# Patient Record
Sex: MALE | Race: WHITE | NOT HISPANIC OR LATINO | URBAN - METROPOLITAN AREA
[De-identification: names, ages, dates, MRNs, and addresses within clinical notes are randomized per-mention and may not be internally consistent; named-entity substitution may affect disease eponyms.]

---

## 2020-07-25 ENCOUNTER — TELEPHONE ENCOUNTER (OUTPATIENT)
Dept: URBAN - METROPOLITAN AREA CLINIC 13 | Facility: CLINIC | Age: 30
End: 2020-07-25

## 2020-07-25 RX ORDER — ADALIMUMAB 40MG/0.8ML
INJECT 40MG SUBCUTANEOUSLY EVERY OTHER WEEK AS DIRECTED KIT SUBCUTANEOUS
Qty: 3 | Refills: 0 | OUTPATIENT
Start: 2019-07-18

## 2020-07-26 ENCOUNTER — TELEPHONE ENCOUNTER (OUTPATIENT)
Dept: URBAN - METROPOLITAN AREA CLINIC 13 | Facility: CLINIC | Age: 30
End: 2020-07-26

## 2020-07-26 RX ORDER — ADALIMUMAB 40MG/0.8ML
INJECT 40MG SUBCUTANEOUSLY EVERY OTHER WEEK AS DIRECTED KIT SUBCUTANEOUS
Qty: 1 | Refills: 3 | Status: ACTIVE | COMMUNITY
Start: 2019-07-18

## 2020-07-26 RX ORDER — ADALIMUMAB 40MG/0.8ML
INJECT 40 MG SUBCUTANEOUSLY EVERY OTHER WEEK AS DIRECTED KIT SUBCUTANEOUS
Refills: 0 | Status: ACTIVE | COMMUNITY

## 2020-07-26 RX ORDER — ADALIMUMAB 40MG/0.4ML
INJECT 40MG SUBCUTANEOUSLY  EVERY OTHER WEEK KIT SUBCUTANEOUS
Qty: 2 | Refills: 6 | Status: ACTIVE | COMMUNITY
Start: 2020-03-17

## 2020-09-08 ENCOUNTER — OFFICE VISIT (OUTPATIENT)
Dept: URBAN - METROPOLITAN AREA CLINIC 113 | Facility: CLINIC | Age: 30
End: 2020-09-08
Payer: COMMERCIAL

## 2020-09-08 VITALS
DIASTOLIC BLOOD PRESSURE: 65 MMHG | HEIGHT: 69 IN | BODY MASS INDEX: 31.1 KG/M2 | HEART RATE: 55 BPM | SYSTOLIC BLOOD PRESSURE: 108 MMHG | TEMPERATURE: 97.3 F | WEIGHT: 210 LBS

## 2020-09-08 DIAGNOSIS — K50.10 CROHN'S DISEASE OF COLON WITHOUT COMPLICATION: ICD-10-CM

## 2020-09-08 PROCEDURE — G8427 DOCREV CUR MEDS BY ELIG CLIN: HCPCS | Performed by: INTERNAL MEDICINE

## 2020-09-08 PROCEDURE — G9903 PT SCRN TBCO ID AS NON USER: HCPCS | Performed by: INTERNAL MEDICINE

## 2020-09-08 PROCEDURE — 99214 OFFICE O/P EST MOD 30 MIN: CPT | Performed by: INTERNAL MEDICINE

## 2020-09-08 PROCEDURE — 82306 VITAMIN D 25 HYDROXY: CPT | Performed by: INTERNAL MEDICINE

## 2020-09-08 PROCEDURE — G8420 CALC BMI NORM PARAMETERS: HCPCS | Performed by: INTERNAL MEDICINE

## 2020-09-08 RX ORDER — ADALIMUMAB 40MG/0.4ML
INJECT 40MG SUBCUTANEOUSLY  EVERY OTHER WEEK KIT SUBCUTANEOUS
Qty: 2 | Refills: 6 | Status: ACTIVE | COMMUNITY
Start: 2020-03-17

## 2020-09-08 RX ORDER — ADALIMUMAB 40MG/0.8ML
INJECT 40MG SUBCUTANEOUSLY EVERY OTHER WEEK AS DIRECTED KIT SUBCUTANEOUS
Qty: 1 | Refills: 3 | Status: ACTIVE | COMMUNITY
Start: 2019-07-18

## 2020-09-08 RX ORDER — METRONIDAZOLE 500 MG/1
TAKE 1 PO TID AS DIRECTED TABLET, FILM COATED ORAL
Qty: 0 | Refills: 0 | Status: ON HOLD | COMMUNITY
Start: 1900-01-01

## 2020-09-08 RX ORDER — SODIUM PICOSULFATE, MAGNESIUM OXIDE, AND ANHYDROUS CITRIC ACID 10; 3.5; 12 MG/160ML; G/160ML; G/160ML
DRINK 160 ML LIQUID ORAL DAILY
Qty: 160 MILLILITER | Refills: 0 | OUTPATIENT
Start: 2020-09-08 | End: 2020-09-09

## 2020-09-08 RX ORDER — IBUPROFEN 100 MG
TAKE 2 TABLETS (200 MG) BY ORAL ROUTE EVERY 6 HOURS AS NEEDED WITH FOOD TABLET ORAL
Qty: 0 | Refills: 0 | Status: ON HOLD | COMMUNITY
Start: 1900-01-01

## 2020-09-08 RX ORDER — SODIUM PICOSULFATE, MAGNESIUM OXIDE, AND ANHYDROUS CITRIC ACID 10; 3.5; 12 MG/16.2G; G/16.2G; G/16.2G
AS DIRECTED POWDER, METERED ORAL
Qty: 1 | Refills: 0 | Status: ON HOLD | COMMUNITY
Start: 2016-04-04

## 2020-09-08 RX ORDER — ADALIMUMAB 40MG/0.8ML
INJECT 40 MG SUBCUTANEOUSLY EVERY OTHER WEEK AS DIRECTED KIT SUBCUTANEOUS
Refills: 0 | Status: ACTIVE | COMMUNITY

## 2020-09-08 RX ORDER — ADALIMUMAB 40MG/0.8ML
INJECT 40 MG SUBCUTANEOUSLY EVERY OTHER WEEK AS DIRECTED KIT SUBCUTANEOUS
OUTPATIENT

## 2020-09-08 NOTE — HPI-OTHER HISTORIES
Mr. Simpson is a 30 year old male with a history of sleep apnea, knee surgery, and Crohn's colitis on Humira 40mg/0.8 ml q 2 weeks, initiially presenting to establish care.  He was last seen by Dr. Martinez July 2019.  He is establishing care with Dr. Prasad next week.      He has Crohn's ileitis diagnosed in 2016 following an illness with symptoms of fatigue, fever, night sweats, and diarrhea with occasional blood per rectum, marginally improved following antibiotic treatment, with relapse of symptoms including weight loss. He had a CT scan in April 2016 which noted segmental colonic wall thickening involving the transverse as well as the left colon. He underwent colonoscopy in May 2016 in [place], [place] which was notable for friability, granularity, ulceration, congestion, erythema in the cecum, transverse colon and sigmoid colon compatible with indeterminate colitis. Biopsies revealed chronic active colitis with mild histologic activity, negative for dysplasia. He was initially treated with Asacol, Flagyl and prednisone, with excellent response. Humira was initiated in July 2016. He did go off of Humira about a year and a half ago secondary to insurance changes and difficulty obtaining the medication. This resulted in recurrence of symptoms, that resolved following re-induction of Humira.  Previous colonoscopy May 2016 by Dr. Brandon Price in [facility] revealed friability, granularity and ulceration in the cecum, transverse colon and sigmoid colon concerning for Crohn's colitis.  Biopsies revealed chronic active colitis, rare granuloma, negative for dysplasia.

## 2020-09-08 NOTE — HPI-TODAY'S VISIT:
Overall he is doing fairly well.  No recent labs.  He had to cancel his colonoscopy due to work.  He is on Humira every other week.  He is no longer on Asacol.  He has occasional blood when he wipes only.  He denies abdominal pain, nausea, vomiting, change in bowel habits, blood in the stool or weight loss.   He quit smoking in 2018.  The patient denies any significant alcohol use, tobacco use or illicit drug use.  He uses Aleve occasionally.   No family history of colon cancer, inflammatory bowel disease GI cancers, peptic ulcer disease or chronic liver disease.

## 2020-09-16 ENCOUNTER — TELEPHONE ENCOUNTER (OUTPATIENT)
Dept: URBAN - METROPOLITAN AREA CLINIC 113 | Facility: CLINIC | Age: 30
End: 2020-09-16

## 2020-09-16 RX ORDER — POLYETHYLENE GLYCOL 3350, SODIUM CHLORIDE, SODIUM BICARBONATE, POTASSIUM CHLORIDE 420; 11.2; 5.72; 1.48 G/4L; G/4L; G/4L; G/4L
TAKE 1ST HALF OF BOWEL PREP 5PM THE NIGHT BEFORE. TAKE 2ND HALF 6 HOURS PRIOR TO PROCEDURE TIME POWDER, FOR SOLUTION ORAL 1
Qty: 4000 MILLILITER | Refills: 0 | OUTPATIENT
Start: 2020-09-16 | End: 2020-09-17

## 2020-09-23 ENCOUNTER — OFFICE VISIT (OUTPATIENT)
Dept: URBAN - METROPOLITAN AREA SURGERY CENTER 25 | Facility: SURGERY CENTER | Age: 30
End: 2020-09-23

## 2020-09-23 ENCOUNTER — TELEPHONE ENCOUNTER (OUTPATIENT)
Dept: URBAN - METROPOLITAN AREA CLINIC 113 | Facility: CLINIC | Age: 30
End: 2020-09-23

## 2020-09-28 ENCOUNTER — OFFICE VISIT (OUTPATIENT)
Dept: URBAN - METROPOLITAN AREA SURGERY CENTER 25 | Facility: SURGERY CENTER | Age: 30
End: 2020-09-28

## 2020-10-06 ENCOUNTER — CLAIMS CREATED FROM THE CLAIM WINDOW (OUTPATIENT)
Dept: URBAN - METROPOLITAN AREA CLINIC 4 | Facility: CLINIC | Age: 30
End: 2020-10-06
Payer: COMMERCIAL

## 2020-10-06 ENCOUNTER — OFFICE VISIT (OUTPATIENT)
Dept: URBAN - METROPOLITAN AREA SURGERY CENTER 25 | Facility: SURGERY CENTER | Age: 30
End: 2020-10-06
Payer: COMMERCIAL

## 2020-10-06 DIAGNOSIS — K50.10 CC (CROHN'S COLITIS): ICD-10-CM

## 2020-10-06 DIAGNOSIS — K63.89 OTHER SPECIFIED DISEASES OF INTESTINE: ICD-10-CM

## 2020-10-06 PROCEDURE — 88305 TISSUE EXAM BY PATHOLOGIST: CPT | Performed by: PATHOLOGY

## 2020-10-06 PROCEDURE — 45380 COLONOSCOPY AND BIOPSY: CPT | Performed by: INTERNAL MEDICINE

## 2020-12-03 ENCOUNTER — TELEPHONE ENCOUNTER (OUTPATIENT)
Dept: URBAN - METROPOLITAN AREA CLINIC 113 | Facility: CLINIC | Age: 30
End: 2020-12-03

## 2020-12-03 RX ORDER — ADALIMUMAB 40MG/0.8ML
INJECT 40 MG SUBCUTANEOUSLY EVERY OTHER WEEK AS DIRECTED KIT SUBCUTANEOUS EVERY OTHER WEEK
Qty: 2 | Refills: 12

## 2020-12-08 ENCOUNTER — OFFICE VISIT (OUTPATIENT)
Dept: URBAN - METROPOLITAN AREA CLINIC 113 | Facility: CLINIC | Age: 30
End: 2020-12-08
Payer: COMMERCIAL

## 2020-12-08 ENCOUNTER — WEB ENCOUNTER (OUTPATIENT)
Dept: URBAN - METROPOLITAN AREA CLINIC 113 | Facility: CLINIC | Age: 30
End: 2020-12-08

## 2020-12-08 VITALS
HEART RATE: 61 BPM | WEIGHT: 211 LBS | DIASTOLIC BLOOD PRESSURE: 61 MMHG | HEIGHT: 69 IN | BODY MASS INDEX: 31.25 KG/M2 | TEMPERATURE: 97.5 F | SYSTOLIC BLOOD PRESSURE: 98 MMHG

## 2020-12-08 DIAGNOSIS — K50.10 CROHN'S DISEASE OF COLON WITHOUT COMPLICATION: ICD-10-CM

## 2020-12-08 PROCEDURE — G8420 CALC BMI NORM PARAMETERS: HCPCS | Performed by: INTERNAL MEDICINE

## 2020-12-08 PROCEDURE — G8427 DOCREV CUR MEDS BY ELIG CLIN: HCPCS | Performed by: INTERNAL MEDICINE

## 2020-12-08 PROCEDURE — 99214 OFFICE O/P EST MOD 30 MIN: CPT | Performed by: INTERNAL MEDICINE

## 2020-12-08 PROCEDURE — G9903 PT SCRN TBCO ID AS NON USER: HCPCS | Performed by: INTERNAL MEDICINE

## 2020-12-08 RX ORDER — SODIUM PICOSULFATE, MAGNESIUM OXIDE, AND ANHYDROUS CITRIC ACID 10; 3.5; 12 MG/16.2G; G/16.2G; G/16.2G
AS DIRECTED POWDER, METERED ORAL
Qty: 1 | Refills: 0 | Status: ON HOLD | COMMUNITY
Start: 2016-04-04

## 2020-12-08 RX ORDER — ADALIMUMAB 40MG/0.4ML
INJECT 40MG SUBCUTANEOUSLY  EVERY OTHER WEEK KIT SUBCUTANEOUS
Qty: 2 | Refills: 6 | Status: ACTIVE | COMMUNITY
Start: 2020-03-17

## 2020-12-08 RX ORDER — ADALIMUMAB 40MG/0.8ML
INJECT 40MG SUBCUTANEOUSLY EVERY OTHER WEEK AS DIRECTED KIT SUBCUTANEOUS
Qty: 1 | Refills: 3 | Status: ACTIVE | COMMUNITY
Start: 2019-07-18

## 2020-12-08 RX ORDER — IBUPROFEN 100 MG
TAKE 2 TABLETS (200 MG) BY ORAL ROUTE EVERY 6 HOURS AS NEEDED WITH FOOD TABLET ORAL
Qty: 0 | Refills: 0 | Status: ON HOLD | COMMUNITY
Start: 1900-01-01

## 2020-12-08 RX ORDER — ADALIMUMAB 40MG/0.8ML
INJECT 40 MG SUBCUTANEOUSLY EVERY OTHER WEEK AS DIRECTED KIT SUBCUTANEOUS EVERY OTHER WEEK
Qty: 2 | Refills: 12

## 2020-12-08 RX ORDER — ADALIMUMAB 40MG/0.8ML
INJECT 40 MG SUBCUTANEOUSLY EVERY OTHER WEEK AS DIRECTED KIT SUBCUTANEOUS EVERY OTHER WEEK
Qty: 2 | Refills: 12 | Status: ACTIVE | COMMUNITY

## 2020-12-08 RX ORDER — METRONIDAZOLE 500 MG/1
TAKE 1 PO TID AS DIRECTED TABLET, FILM COATED ORAL
Qty: 0 | Refills: 0 | Status: ON HOLD | COMMUNITY
Start: 1900-01-01

## 2022-08-16 ENCOUNTER — WEB ENCOUNTER (OUTPATIENT)
Dept: URBAN - METROPOLITAN AREA CLINIC 113 | Facility: CLINIC | Age: 32
End: 2022-08-16

## 2022-08-22 ENCOUNTER — OFFICE VISIT (OUTPATIENT)
Dept: URBAN - METROPOLITAN AREA CLINIC 113 | Facility: CLINIC | Age: 32
End: 2022-08-22
Payer: COMMERCIAL

## 2022-08-22 ENCOUNTER — WEB ENCOUNTER (OUTPATIENT)
Dept: URBAN - METROPOLITAN AREA CLINIC 113 | Facility: CLINIC | Age: 32
End: 2022-08-22

## 2022-08-22 VITALS
WEIGHT: 200 LBS | HEIGHT: 69 IN | BODY MASS INDEX: 29.62 KG/M2 | HEART RATE: 62 BPM | DIASTOLIC BLOOD PRESSURE: 74 MMHG | SYSTOLIC BLOOD PRESSURE: 126 MMHG | TEMPERATURE: 98.2 F

## 2022-08-22 DIAGNOSIS — K50.10 CROHN'S DISEASE OF COLON WITHOUT COMPLICATION: ICD-10-CM

## 2022-08-22 PROCEDURE — 99214 OFFICE O/P EST MOD 30 MIN: CPT | Performed by: INTERNAL MEDICINE

## 2022-08-22 RX ORDER — ADALIMUMAB 40MG/0.8ML
INJECT 40MG SUBCUTANEOUSLY EVERY OTHER WEEK AS DIRECTED KIT SUBCUTANEOUS
Qty: 1 | Refills: 3 | Status: ACTIVE | COMMUNITY
Start: 2019-07-18

## 2022-08-22 RX ORDER — SODIUM PICOSULFATE, MAGNESIUM OXIDE, AND ANHYDROUS CITRIC ACID 10; 3.5; 12 MG/16.2G; G/16.2G; G/16.2G
AS DIRECTED POWDER, METERED ORAL
Qty: 1 | Refills: 0 | Status: ON HOLD | COMMUNITY
Start: 2016-04-04

## 2022-08-22 RX ORDER — ADALIMUMAB 40MG/0.8ML
INJECT 40 MG SUBCUTANEOUSLY EVERY OTHER WEEK AS DIRECTED KIT SUBCUTANEOUS EVERY OTHER WEEK
Qty: 2 | Refills: 12 | Status: ACTIVE | COMMUNITY

## 2022-08-22 RX ORDER — ADALIMUMAB 40MG/0.4ML
INJECT 40MG SUBCUTANEOUSLY  EVERY OTHER WEEK KIT SUBCUTANEOUS
Qty: 2 | Refills: 6 | Status: ACTIVE | COMMUNITY
Start: 2020-03-17

## 2022-08-22 RX ORDER — IBUPROFEN 100 MG
TAKE 2 TABLETS (200 MG) BY ORAL ROUTE EVERY 6 HOURS AS NEEDED WITH FOOD TABLET ORAL
Qty: 0 | Refills: 0 | Status: ON HOLD | COMMUNITY
Start: 1900-01-01

## 2022-08-22 RX ORDER — METRONIDAZOLE 500 MG/1
TAKE 1 PO TID AS DIRECTED TABLET, FILM COATED ORAL
Qty: 0 | Refills: 0 | Status: ON HOLD | COMMUNITY
Start: 1900-01-01

## 2022-08-22 RX ORDER — ADALIMUMAB 40MG/0.8ML
INJECT 40 MG SUBCUTANEOUSLY EVERY OTHER WEEK AS DIRECTED KIT SUBCUTANEOUS EVERY OTHER WEEK
Qty: 2 | Refills: 12

## 2022-08-22 NOTE — HPI-TODAY'S VISIT:
Mr. Simpson is a 32 year old male with a history of sleep apnea and Crohn's colitis previously on Humira 40mg/0.8 ml q 2 weeks here for long interval follow up.   He was last seen in the office December 2020 for colonoscopy follow-up.  He self discontinued Humira around April mainly due to frequent traveling.  He has been on no medication since that time.  Overall he is doing fairly well and having semiformed bowel movements daily without blood.  He denies abdominal pain, nausea, vomiting, weight loss, fevers or chills.  He has not had labs for over 1 year, but is due for routine labs with his PCP next month.  He was tolerating Humira fairly well, but did not like injections every 2 weeks as this was too frequent.  Prior colonoscopy 10/6/2020 for assessment of Crohn's disease revealed mild inflammation characterized by erythema, friability and pseudopolyps mainly in the rectum and sigmoid colon, the remainder the colon was otherwise normal, the distal ileum was also normal.  Multiple random colon biopsies revealed no significant abnormalities, no evidence of dysplasia or inflammation.  He has Crohn's ileitis diagnosed in 2016.   He had a CT scan in April 2016 which noted segmental colonic wall thickening involving the transverse as well as the left colon. He underwent colonoscopy in May 2016 in [place], [place] which was notable for friability, granularity, ulceration, congestion, erythema in the cecum, transverse colon and sigmoid colon compatible with indeterminate colitis. Biopsies revealed chronic active colitis with mild histologic activity, negative for dysplasia. He was initially treated with Asacol, Flagyl and prednisone, with excellent response. Humira was initiated in July 2016. Previous colonoscopy May 2016 by Dr. Brandon Price in [facility] revealed friability, granularity and ulceration in the cecum, transverse colon and sigmoid colon concerning for Crohn's.  Biopsies revealed chronic active colitis, rare granuloma, negative for dysplasia.

## 2022-11-22 ENCOUNTER — OFFICE VISIT (OUTPATIENT)
Dept: URBAN - METROPOLITAN AREA CLINIC 113 | Facility: CLINIC | Age: 32
End: 2022-11-22
Payer: COMMERCIAL

## 2022-11-22 ENCOUNTER — WEB ENCOUNTER (OUTPATIENT)
Dept: URBAN - METROPOLITAN AREA CLINIC 113 | Facility: CLINIC | Age: 32
End: 2022-11-22

## 2022-11-22 VITALS
DIASTOLIC BLOOD PRESSURE: 63 MMHG | BODY MASS INDEX: 28.73 KG/M2 | WEIGHT: 194 LBS | TEMPERATURE: 97.5 F | HEART RATE: 74 BPM | HEIGHT: 69 IN | SYSTOLIC BLOOD PRESSURE: 108 MMHG | RESPIRATION RATE: 20 BRPM

## 2022-11-22 DIAGNOSIS — K50.10 CROHN'S DISEASE OF COLON WITHOUT COMPLICATION: ICD-10-CM

## 2022-11-22 PROCEDURE — 99213 OFFICE O/P EST LOW 20 MIN: CPT | Performed by: NURSE PRACTITIONER

## 2022-11-22 RX ORDER — ADALIMUMAB 40MG/0.8ML
INJECT 40 MG SUBCUTANEOUSLY EVERY OTHER WEEK AS DIRECTED KIT SUBCUTANEOUS EVERY OTHER WEEK
Qty: 2 | Refills: 12 | Status: ON HOLD | COMMUNITY

## 2022-11-22 RX ORDER — IBUPROFEN 100 MG
TAKE 2 TABLETS (200 MG) BY ORAL ROUTE EVERY 6 HOURS AS NEEDED WITH FOOD TABLET ORAL
Qty: 0 | Refills: 0 | Status: ON HOLD | COMMUNITY
Start: 1900-01-01

## 2022-11-22 RX ORDER — ADALIMUMAB 40MG/0.4ML
INJECT 40MG SUBCUTANEOUSLY  EVERY OTHER WEEK KIT SUBCUTANEOUS
Qty: 2 | Refills: 6 | Status: ON HOLD | COMMUNITY
Start: 2020-03-17

## 2022-11-22 RX ORDER — METRONIDAZOLE 500 MG/1
TAKE 1 PO TID AS DIRECTED TABLET, FILM COATED ORAL
Qty: 0 | Refills: 0 | Status: ON HOLD | COMMUNITY
Start: 1900-01-01

## 2022-11-22 RX ORDER — SODIUM PICOSULFATE, MAGNESIUM OXIDE, AND ANHYDROUS CITRIC ACID 10; 3.5; 12 MG/16.2G; G/16.2G; G/16.2G
AS DIRECTED POWDER, METERED ORAL
Qty: 1 | Refills: 0 | Status: ON HOLD | COMMUNITY
Start: 2016-04-04

## 2022-11-22 RX ORDER — ADALIMUMAB 40MG/0.8ML
INJECT 40MG SUBCUTANEOUSLY EVERY OTHER WEEK AS DIRECTED KIT SUBCUTANEOUS
Qty: 1 | Refills: 3 | Status: ON HOLD | COMMUNITY
Start: 2019-07-18

## 2022-11-22 NOTE — HPI-TODAY'S VISIT:
Mr. Simpson is a 32 year old male with a history of sleep apnea and Crohn's colitis previously on Humira 40mg/0.8 ml q 2 weeks presenting for follow up. He self discontinued Humira around April mainly due to frequent traveling.  He has been on no medication since that time. He was seen in the office in August for follow-up of Crohn's colitis, clinically doing well following self-discontinuation of Humira. Prior colonoscopy revealed mild inflammation in the rectosigmoid mainly with scarring and pseudopolyps; random colon biopsies were unremarkable. Routine labs were planned, with consideration for reinduction of Humira versus initiation of Stelara or Entyvio SQ pending clinical course. He continues to do fairly well from a GI standpoint. He has occasional postprandial flares of diffuse abdominal discomfort and diarrhea, which he attributes to diet. His bowel habits are otherwise regular. He does see a scant amount of red blood on the toilet paper with wiping on occasion, which he thinks may be related to hemorrhoids. He is interested in resuming biologic therapy. He did not have labs performed last visit.

## 2022-11-22 NOTE — HPI-OTHER HISTORIES
Prior colonoscopy 10/6/2020 for assessment of Crohn's disease revealed mild inflammation characterized by erythema, friability and pseudopolyps mainly in the rectum and sigmoid colon, the remainder the colon was otherwise normal, the distal ileum was also normal.  Multiple random colon biopsies revealed no significant abnormalities, no evidence of dysplasia or inflammation.  He has Crohn's ileitis diagnosed in 2016.   He had a CT scan in April 2016 which noted segmental colonic wall thickening involving the transverse as well as the left colon. He underwent colonoscopy in May 2016 in [place], [place] which was notable for friability, granularity, ulceration, congestion, erythema in the cecum, transverse colon and sigmoid colon compatible with indeterminate colitis. Biopsies revealed chronic active colitis with mild histologic activity, negative for dysplasia. He was initially treated with Asacol, Flagyl and prednisone, with excellent response. Humira was initiated in July 2016. Previous colonoscopy May 2016 by Dr. Brandon Price in [facility] revealed friability, granularity and ulceration in the cecum, transverse colon and sigmoid colon concerning for Crohn's.  Biopsies revealed chronic active colitis, rare granuloma, negative for dysplasia.

## 2022-12-08 ENCOUNTER — TELEPHONE ENCOUNTER (OUTPATIENT)
Dept: URBAN - METROPOLITAN AREA CLINIC 113 | Facility: CLINIC | Age: 32
End: 2022-12-08

## 2022-12-08 RX ORDER — USTEKINUMAB 90 MG/ML
AS DIRECTED INJECTION, SOLUTION SUBCUTANEOUS
Qty: 3 SYRINGES | Refills: 2 | OUTPATIENT
Start: 2022-12-08 | End: 2023-09-04

## 2022-12-08 RX ORDER — USTEKINUMAB 130 MG/26ML
AS DIRECTED SOLUTION INTRAVENOUS ONCE
Qty: 390 MILLIGRAMS | Refills: 0 | OUTPATIENT
Start: 2022-12-08 | End: 2022-12-09

## 2022-12-20 ENCOUNTER — LAB OUTSIDE AN ENCOUNTER (OUTPATIENT)
Dept: URBAN - METROPOLITAN AREA CLINIC 113 | Facility: CLINIC | Age: 32
End: 2022-12-20

## 2022-12-24 LAB
A/G RATIO: 2.3
ABSOLUTE BASOPHILS: 39
ABSOLUTE EOSINOPHILS: 209
ABSOLUTE LYMPHOCYTES: 1491
ABSOLUTE MONOCYTES: 308
ABSOLUTE NEUTROPHILS: 3454
ALBUMIN: 4.8
ALKALINE PHOSPHATASE: 46
ALT (SGPT): 42
AST (SGOT): 52
BASOPHILS: 0.7
BILIRUBIN, TOTAL: 0.7
BUN/CREATININE RATIO: (no result)
BUN: 14
C-REACTIVE PROTEIN, QUANT: 2.1
CALCIUM: 9.7
CARBON DIOXIDE, TOTAL: 27
CHLORIDE: 103
CREATININE: 1.07
EGFR: 96
EOSINOPHILS: 3.8
GLOBULIN, TOTAL: 2.1
GLUCOSE: 93
HBSAG SCREEN: (no result)
HEMATOCRIT: 42.4
HEMOGLOBIN: 14.9
HEP A AB, IGM: (no result)
HEP B CORE AB, IGM: (no result)
HEP C VIRUS AB: <0.02
HEPATITIS A AB, TOTAL: REACTIVE
HEPATITIS B CORE AB TOTAL: (no result)
HEPATITIS B SURFACE AB, QN: 18
HEPATITIS B SURFACE ANTIGEN: (no result)
HEPATITIS C ANTIBODY: (no result)
INTERPRETATION: (no result)
LYMPHOCYTES: 27.1
MCH: 29.5
MCHC: 35.1
MCV: 84
MITOGEN-NIL: >10
MONOCYTES: 5.6
MPV: 9.6
NEUTROPHILS: 62.8
NIL: 0.04
PLATELET COUNT: 204
POTASSIUM: 3.9
PROTEIN, TOTAL: 6.9
QUANTIFERON(R)-TB GOLD: NEGATIVE
RDW: 12.4
RED BLOOD CELL COUNT: 5.05
SED RATE BY MODIFIED: 2
SODIUM: 139
TB1-NIL: 0
TB2-NIL: <0
VITAMIN D,25-OH,TOTAL,IA: 34
WHITE BLOOD CELL COUNT: 5.5

## 2022-12-29 ENCOUNTER — TELEPHONE ENCOUNTER (OUTPATIENT)
Dept: URBAN - METROPOLITAN AREA CLINIC 112 | Facility: CLINIC | Age: 32
End: 2022-12-29

## 2023-02-23 ENCOUNTER — OFFICE VISIT (OUTPATIENT)
Dept: URBAN - METROPOLITAN AREA CLINIC 113 | Facility: CLINIC | Age: 33
End: 2023-02-23
Payer: COMMERCIAL

## 2023-02-23 VITALS
TEMPERATURE: 97.5 F | DIASTOLIC BLOOD PRESSURE: 75 MMHG | WEIGHT: 196 LBS | SYSTOLIC BLOOD PRESSURE: 128 MMHG | HEART RATE: 69 BPM | BODY MASS INDEX: 29.03 KG/M2 | RESPIRATION RATE: 18 BRPM | HEIGHT: 69 IN

## 2023-02-23 DIAGNOSIS — K50.10 CROHN'S DISEASE OF COLON WITHOUT COMPLICATION: ICD-10-CM

## 2023-02-23 DIAGNOSIS — R74.8 ELEVATED LIVER ENZYMES: ICD-10-CM

## 2023-02-23 PROBLEM — 7620006 CROHN'S DISEASE OF LARGE BOWEL: Status: ACTIVE | Noted: 2022-08-22

## 2023-02-23 PROCEDURE — 99214 OFFICE O/P EST MOD 30 MIN: CPT | Performed by: NURSE PRACTITIONER

## 2023-02-23 RX ORDER — IBUPROFEN 100 MG
TAKE 2 TABLETS (200 MG) BY ORAL ROUTE EVERY 6 HOURS AS NEEDED WITH FOOD TABLET ORAL
Qty: 0 | Refills: 0 | Status: ON HOLD | COMMUNITY
Start: 1900-01-01

## 2023-02-23 RX ORDER — SODIUM PICOSULFATE, MAGNESIUM OXIDE, AND ANHYDROUS CITRIC ACID 10; 3.5; 12 MG/16.2G; G/16.2G; G/16.2G
AS DIRECTED POWDER, METERED ORAL
Qty: 1 | Refills: 0 | Status: ON HOLD | COMMUNITY
Start: 2016-04-04

## 2023-02-23 RX ORDER — ADALIMUMAB 40MG/0.4ML
INJECT 40MG SUBCUTANEOUSLY  EVERY OTHER WEEK KIT SUBCUTANEOUS
Qty: 2 | Refills: 6 | Status: ON HOLD | COMMUNITY
Start: 2020-03-17

## 2023-02-23 RX ORDER — METRONIDAZOLE 500 MG/1
TAKE 1 PO TID AS DIRECTED TABLET, FILM COATED ORAL
Qty: 0 | Refills: 0 | Status: ON HOLD | COMMUNITY
Start: 1900-01-01

## 2023-02-23 RX ORDER — USTEKINUMAB 90 MG/ML
AS DIRECTED INJECTION, SOLUTION SUBCUTANEOUS
Qty: 3 SYRINGES | Refills: 2 | OUTPATIENT
Start: 2023-02-23 | End: 2023-11-20

## 2023-02-23 RX ORDER — USTEKINUMAB 90 MG/ML
AS DIRECTED INJECTION, SOLUTION SUBCUTANEOUS
Qty: 3 SYRINGES | Refills: 2 | Status: ACTIVE | COMMUNITY
Start: 2022-12-08 | End: 2023-09-04

## 2023-02-23 RX ORDER — ADALIMUMAB 40MG/0.8ML
INJECT 40 MG SUBCUTANEOUSLY EVERY OTHER WEEK AS DIRECTED KIT SUBCUTANEOUS EVERY OTHER WEEK
Qty: 2 | Refills: 12 | Status: ON HOLD | COMMUNITY

## 2023-02-23 RX ORDER — ADALIMUMAB 40MG/0.8ML
INJECT 40MG SUBCUTANEOUSLY EVERY OTHER WEEK AS DIRECTED KIT SUBCUTANEOUS
Qty: 1 | Refills: 3 | Status: ON HOLD | COMMUNITY
Start: 2019-07-18

## 2023-02-23 NOTE — HPI-OTHER HISTORIES
Prior colonoscopy 10/6/2020 for assessment of Crohn's disease revealed mild inflammation characterized by erythema, friability and pseudopolyps mainly in the rectum and sigmoid colon, the remainder the colon was otherwise normal, the distal ileum was also normal.  Multiple random colon biopsies revealed no significant abnormalities, no evidence of dysplasia or inflammation.  He has Crohn's ileitis diagnosed in 2016.   He had a CT scan in April 2016 which noted segmental colonic wall thickening involving the transverse as well as the left colon. He underwent colonoscopy in May 2016 in [place], [place] which was notable for friability, granularity, ulceration, congestion, erythema in the cecum, transverse colon and sigmoid colon compatible with indeterminate colitis. Biopsies revealed chronic active colitis with mild histologic activity, negative for dysplasia. He was initially treated with Asacol, Flagyl and prednisone, with excellent response. Humira was initiated in July 2016. Previous colonoscopy May 2016 by Dr. Brandon Price in [facility] revealed friability, granularity and ulceration in the cecum, transverse colon and sigmoid colon concerning for Crohn's.  Biopsies revealed chronic active colitis, rare granuloma, negative for dysplasia.

## 2023-02-24 LAB
ALBUMIN/GLOBULIN RATIO: 2.3
ALBUMIN: 4.8
ALKALINE PHOSPHATASE: 44
ALT (SGPT): 23
AST (SGOT): 17
BILIRUBIN, DIRECT: 0.1
BILIRUBIN, INDIRECT: 0.6
BILIRUBIN, TOTAL: 0.7
GLOBULIN: 2.1
PROTEIN, TOTAL: 6.9

## 2023-03-02 ENCOUNTER — TELEPHONE ENCOUNTER (OUTPATIENT)
Dept: URBAN - METROPOLITAN AREA CLINIC 113 | Facility: CLINIC | Age: 33
End: 2023-03-02

## 2023-03-10 ENCOUNTER — WEB ENCOUNTER (OUTPATIENT)
Dept: URBAN - METROPOLITAN AREA CLINIC 113 | Facility: CLINIC | Age: 33
End: 2023-03-10

## 2023-03-22 ENCOUNTER — WEB ENCOUNTER (OUTPATIENT)
Dept: URBAN - METROPOLITAN AREA CLINIC 113 | Facility: CLINIC | Age: 33
End: 2023-03-22

## 2023-03-27 ENCOUNTER — OFFICE VISIT (OUTPATIENT)
Dept: URBAN - METROPOLITAN AREA CLINIC 112 | Facility: CLINIC | Age: 33
End: 2023-03-27

## 2023-04-13 ENCOUNTER — TELEPHONE ENCOUNTER (OUTPATIENT)
Dept: URBAN - METROPOLITAN AREA CLINIC 23 | Facility: CLINIC | Age: 33
End: 2023-04-13

## 2023-04-20 ENCOUNTER — TELEPHONE ENCOUNTER (OUTPATIENT)
Dept: URBAN - METROPOLITAN AREA CLINIC 23 | Facility: CLINIC | Age: 33
End: 2023-04-20

## 2023-04-25 ENCOUNTER — OFFICE VISIT (OUTPATIENT)
Dept: URBAN - METROPOLITAN AREA CLINIC 112 | Facility: CLINIC | Age: 33
End: 2023-04-25
Payer: COMMERCIAL

## 2023-04-25 ENCOUNTER — TELEPHONE ENCOUNTER (OUTPATIENT)
Dept: URBAN - METROPOLITAN AREA CLINIC 113 | Facility: CLINIC | Age: 33
End: 2023-04-25

## 2023-04-25 VITALS
HEART RATE: 58 BPM | RESPIRATION RATE: 16 BRPM | HEIGHT: 69 IN | BODY MASS INDEX: 27.55 KG/M2 | DIASTOLIC BLOOD PRESSURE: 63 MMHG | WEIGHT: 186 LBS | SYSTOLIC BLOOD PRESSURE: 108 MMHG | TEMPERATURE: 97.7 F

## 2023-04-25 DIAGNOSIS — K50.80 CROHN'S COLITIS: ICD-10-CM

## 2023-04-25 PROCEDURE — 96413 CHEMO IV INFUSION 1 HR: CPT | Performed by: INTERNAL MEDICINE

## 2023-04-25 RX ORDER — USTEKINUMAB 90 MG/ML
AS DIRECTED INJECTION, SOLUTION SUBCUTANEOUS
Qty: 3 SYRINGES | Refills: 2 | Status: ACTIVE | COMMUNITY
Start: 2022-12-08 | End: 2023-09-04

## 2023-04-25 RX ORDER — ADALIMUMAB 40MG/0.8ML
INJECT 40 MG SUBCUTANEOUSLY EVERY OTHER WEEK AS DIRECTED KIT SUBCUTANEOUS EVERY OTHER WEEK
Qty: 2 | Refills: 12 | Status: ON HOLD | COMMUNITY

## 2023-04-25 RX ORDER — METRONIDAZOLE 500 MG/1
TAKE 1 PO TID AS DIRECTED TABLET, FILM COATED ORAL
Qty: 0 | Refills: 0 | Status: ON HOLD | COMMUNITY
Start: 1900-01-01

## 2023-04-25 RX ORDER — SODIUM PICOSULFATE, MAGNESIUM OXIDE, AND ANHYDROUS CITRIC ACID 10; 3.5; 12 MG/16.2G; G/16.2G; G/16.2G
AS DIRECTED POWDER, METERED ORAL
Qty: 1 | Refills: 0 | Status: ON HOLD | COMMUNITY
Start: 2016-04-04

## 2023-04-25 RX ORDER — ADALIMUMAB 40MG/0.4ML
INJECT 40MG SUBCUTANEOUSLY  EVERY OTHER WEEK KIT SUBCUTANEOUS
Qty: 2 | Refills: 6 | Status: ON HOLD | COMMUNITY
Start: 2020-03-17

## 2023-04-25 RX ORDER — ADALIMUMAB 40MG/0.8ML
INJECT 40MG SUBCUTANEOUSLY EVERY OTHER WEEK AS DIRECTED KIT SUBCUTANEOUS
Qty: 1 | Refills: 3 | Status: ON HOLD | COMMUNITY
Start: 2019-07-18

## 2023-04-25 RX ORDER — USTEKINUMAB 90 MG/ML
AS DIRECTED INJECTION, SOLUTION SUBCUTANEOUS
Qty: 3 SYRINGES | Refills: 2 | Status: ACTIVE | COMMUNITY
Start: 2023-02-23 | End: 2023-11-20

## 2023-04-25 RX ORDER — IBUPROFEN 100 MG
TAKE 2 TABLETS (200 MG) BY ORAL ROUTE EVERY 6 HOURS AS NEEDED WITH FOOD TABLET ORAL
Qty: 0 | Refills: 0 | Status: ON HOLD | COMMUNITY
Start: 1900-01-01

## 2023-05-03 ENCOUNTER — TELEPHONE ENCOUNTER (OUTPATIENT)
Dept: URBAN - METROPOLITAN AREA CLINIC 113 | Facility: CLINIC | Age: 33
End: 2023-05-03

## 2023-06-30 ENCOUNTER — OFFICE VISIT (OUTPATIENT)
Dept: URBAN - METROPOLITAN AREA CLINIC 113 | Facility: CLINIC | Age: 33
End: 2023-06-30

## 2023-08-29 ENCOUNTER — OFFICE VISIT (OUTPATIENT)
Dept: URBAN - METROPOLITAN AREA CLINIC 113 | Facility: CLINIC | Age: 33
End: 2023-08-29
Payer: COMMERCIAL

## 2023-08-29 VITALS
TEMPERATURE: 97.1 F | HEIGHT: 69 IN | WEIGHT: 191 LBS | SYSTOLIC BLOOD PRESSURE: 152 MMHG | BODY MASS INDEX: 28.29 KG/M2 | RESPIRATION RATE: 14 BRPM | DIASTOLIC BLOOD PRESSURE: 77 MMHG | HEART RATE: 61 BPM

## 2023-08-29 DIAGNOSIS — K50.10 CROHN'S DISEASE OF COLON WITHOUT COMPLICATION: ICD-10-CM

## 2023-08-29 DIAGNOSIS — Z79.899 HIGH RISK MEDICATION USE: ICD-10-CM

## 2023-08-29 DIAGNOSIS — R74.8 ELEVATED LIVER ENZYMES: ICD-10-CM

## 2023-08-29 PROCEDURE — 99214 OFFICE O/P EST MOD 30 MIN: CPT | Performed by: NURSE PRACTITIONER

## 2023-08-29 RX ORDER — SODIUM PICOSULFATE, MAGNESIUM OXIDE, AND ANHYDROUS CITRIC ACID 10; 3.5; 12 MG/16.2G; G/16.2G; G/16.2G
AS DIRECTED POWDER, METERED ORAL
Qty: 1 | Refills: 0 | Status: ON HOLD | COMMUNITY
Start: 2016-04-04

## 2023-08-29 RX ORDER — ADALIMUMAB 40MG/0.8ML
INJECT 40MG SUBCUTANEOUSLY EVERY OTHER WEEK AS DIRECTED KIT SUBCUTANEOUS
Qty: 1 | Refills: 3 | Status: ON HOLD | COMMUNITY
Start: 2019-07-18

## 2023-08-29 RX ORDER — IBUPROFEN 100 MG
TAKE 2 TABLETS (200 MG) BY ORAL ROUTE EVERY 6 HOURS AS NEEDED WITH FOOD TABLET ORAL
Qty: 0 | Refills: 0 | Status: ON HOLD | COMMUNITY
Start: 1900-01-01

## 2023-08-29 RX ORDER — ADALIMUMAB 40MG/0.4ML
INJECT 40MG SUBCUTANEOUSLY  EVERY OTHER WEEK KIT SUBCUTANEOUS
Qty: 2 | Refills: 6 | Status: ON HOLD | COMMUNITY
Start: 2020-03-17

## 2023-08-29 RX ORDER — ADALIMUMAB 40MG/0.8ML
INJECT 40 MG SUBCUTANEOUSLY EVERY OTHER WEEK AS DIRECTED KIT SUBCUTANEOUS EVERY OTHER WEEK
Qty: 2 | Refills: 12 | Status: ON HOLD | COMMUNITY

## 2023-08-29 RX ORDER — USTEKINUMAB 90 MG/ML
AS DIRECTED INJECTION, SOLUTION SUBCUTANEOUS
Qty: 3 SYRINGES | Refills: 2 | Status: ACTIVE | COMMUNITY
Start: 2022-12-08 | End: 2023-09-04

## 2023-08-29 RX ORDER — USTEKINUMAB 90 MG/ML
AS DIRECTED INJECTION, SOLUTION SUBCUTANEOUS
Qty: 3 SYRINGES | Refills: 2 | Status: ACTIVE | COMMUNITY
Start: 2023-02-23 | End: 2023-11-20

## 2023-08-29 RX ORDER — METRONIDAZOLE 500 MG/1
TAKE 1 PO TID AS DIRECTED TABLET, FILM COATED ORAL
Qty: 0 | Refills: 0 | Status: ON HOLD | COMMUNITY
Start: 1900-01-01

## 2023-08-29 NOTE — HPI-OTHER HISTORIES
Labs 12/20/2022:H/H14.9/42.4, MCV 84, , WBC 5.5.  AST 52, ALT 42, ALP 46, T. bili 0.7, CMP otherwise unremarkable.  Normal vitamin D, CRP, ESR.  Negative QuantiFERON-TB gold.  Reactive hepatitis A antibody total.  Nonreactive hepatitis A antibody IgM, hepatitis B surface antigen and antibody, hepatitis B core antibody IgM, hepatitis C antibody. Prior colonoscopy 10/6/2020 for assessment of Crohn's disease revealed mild inflammation characterized by erythema, friability and pseudopolyps mainly in the rectum and sigmoid colon, the remainder the colon was otherwise normal, the distal ileum was also normal.  Multiple random colon biopsies revealed no significant abnormalities, no evidence of dysplasia or inflammation.  He has Crohn's ileitis diagnosed in 2016.   He had a CT scan in April 2016 which noted segmental colonic wall thickening involving the transverse as well as the left colon. He underwent colonoscopy in May 2016 in [place], [place] which was notable for friability, granularity, ulceration, congestion, erythema in the cecum, transverse colon and sigmoid colon compatible with indeterminate colitis. Biopsies revealed chronic active colitis with mild histologic activity, negative for dysplasia. He was initially treated with Asacol, Flagyl and prednisone, with excellent response. Humira was initiated in July 2016. Previous colonoscopy May 2016 by Dr. Brandon Price in [facility] revealed friability, granularity and ulceration in the cecum, transverse colon and sigmoid colon concerning for Crohn's.  Biopsies revealed chronic active colitis, rare granuloma, negative for dysplasia.

## 2023-08-29 NOTE — HPI-TODAY'S VISIT:
Mr. Simpson is a 33 year old male with a history of sleep apnea and Crohn's colitis on Stelara every 8 weeks presenting for follow-up. He was previously on Humira but self-discontinued the medication. He was seen in the office in February for follow-up of Crohn's colitis. He was fairly asymptomatic following previous self-discontinuation of Humira and was awaiting induction of Stelara. Recent labs showed a mild elevation in liver enzymes; a hepatic function panel was planned to trend his LFTs. Labs 2/23/23: AST 17, ALT 23, ALP 44, Tbili 0.7. He received his Stelara induction infusion on 4/25/2023.  In June, he was  and went on a 2-week honeymoon out of the country.  During this time he developed a rash which transitioned into hives during his plane travel back to the [place].  He took his first Stelara maintenance injection the following week without any exacerbation of the rash.  He was ultimately seen at urgent care and received a methylprednisone Dosepak, topical cream, and as needed Benadryl with resolution.  Unfortunately, the hives returned last week.  He was seen by his PCP and started on another course of methylprednisone and encouraged use of Allegra and Zyrtec.  He was ultimately seen in the ER at [facility] where they gave him an EpiPen injection.  His last Stelara maintenance injection was on 8/15, about a week prior to the recurrence of the rash/hives.  He continues to have patchy, erythematous lesions to his hands and trunk.  He is seeing an allergist this afternoon.  Overall, he is doing well from a GI perspective.  He did experience some stool urgency when traveling over this last weekend but otherwise denies any diarrhea or blood in the stool.  No abdominal pain.

## 2023-08-30 ENCOUNTER — TELEPHONE ENCOUNTER (OUTPATIENT)
Dept: URBAN - METROPOLITAN AREA CLINIC 113 | Facility: CLINIC | Age: 33
End: 2023-08-30

## 2023-08-31 ENCOUNTER — WEB ENCOUNTER (OUTPATIENT)
Dept: URBAN - METROPOLITAN AREA CLINIC 113 | Facility: CLINIC | Age: 33
End: 2023-08-31

## 2023-09-01 LAB
A/G RATIO: 1.8
ABSOLUTE BASOPHILS: 12
ABSOLUTE EOSINOPHILS: 0
ABSOLUTE LYMPHOCYTES: 787
ABSOLUTE MONOCYTES: 424
ABSOLUTE NEUTROPHILS: (no result)
ALBUMIN: 4.4
ALKALINE PHOSPHATASE: 40
ALT (SGPT): 17
AST (SGOT): 13
BASOPHILS: 0.1
BILIRUBIN, TOTAL: 0.7
BUN/CREATININE RATIO: (no result)
BUN: 15
C-REACTIVE PROTEIN, QUANT: 16.1
CALCIUM: 9.6
CARBON DIOXIDE, TOTAL: 24
CHLORIDE: 106
CREATININE: 1.01
EGFR: 102
EOSINOPHILS: 0
GLOBULIN, TOTAL: 2.4
GLUCOSE: 105
HEMATOCRIT: 43.7
HEMOGLOBIN: 14.8
LYMPHOCYTES: 6.5
MCH: 31.1
MCHC: 33.9
MCV: 91.8
MONOCYTES: 3.5
MPV: 9.5
NEUTROPHILS: 89.9
PLATELET COUNT: 209
POTASSIUM: 4
PROTEIN, TOTAL: 6.8
RDW: 11.8
RED BLOOD CELL COUNT: 4.76
SED RATE BY MODIFIED: 2
SODIUM: 141
USTEKINUMAB AB, S: <10
USTEKINUMAB: >10
VITAMIN D,25-OH,TOTAL,IA: 33
WHITE BLOOD CELL COUNT: 12.1

## 2023-09-06 ENCOUNTER — TELEPHONE ENCOUNTER (OUTPATIENT)
Dept: URBAN - METROPOLITAN AREA CLINIC 113 | Facility: CLINIC | Age: 33
End: 2023-09-06

## 2023-09-08 ENCOUNTER — TELEPHONE ENCOUNTER (OUTPATIENT)
Dept: URBAN - METROPOLITAN AREA CLINIC 113 | Facility: CLINIC | Age: 33
End: 2023-09-08

## 2023-11-01 ENCOUNTER — OFFICE VISIT (OUTPATIENT)
Dept: URBAN - METROPOLITAN AREA CLINIC 113 | Facility: CLINIC | Age: 33
End: 2023-11-01
Payer: COMMERCIAL

## 2023-11-01 VITALS
WEIGHT: 183 LBS | DIASTOLIC BLOOD PRESSURE: 79 MMHG | TEMPERATURE: 97.8 F | HEART RATE: 81 BPM | BODY MASS INDEX: 27.11 KG/M2 | SYSTOLIC BLOOD PRESSURE: 110 MMHG | HEIGHT: 69 IN

## 2023-11-01 DIAGNOSIS — L50.8 CHRONIC URTICARIA: ICD-10-CM

## 2023-11-01 DIAGNOSIS — Z79.899 HIGH RISK MEDICATION USE: ICD-10-CM

## 2023-11-01 DIAGNOSIS — R74.8 ELEVATED LIVER ENZYMES: ICD-10-CM

## 2023-11-01 DIAGNOSIS — K50.10 CROHN'S DISEASE OF COLON WITHOUT COMPLICATION: ICD-10-CM

## 2023-11-01 PROBLEM — 51611005: Status: ACTIVE | Noted: 2023-11-01

## 2023-11-01 PROCEDURE — 99214 OFFICE O/P EST MOD 30 MIN: CPT | Performed by: INTERNAL MEDICINE

## 2023-11-01 RX ORDER — ADALIMUMAB 40MG/0.8ML
INJECT 40 MG SUBCUTANEOUSLY EVERY OTHER WEEK AS DIRECTED KIT SUBCUTANEOUS EVERY OTHER WEEK
Qty: 2 | Refills: 12 | Status: ON HOLD | COMMUNITY

## 2023-11-01 RX ORDER — FEXOFENADINE HCL 60 MG/1
1 TABLET TABLET, FILM COATED ORAL TWICE A DAY
Status: ACTIVE | COMMUNITY

## 2023-11-01 RX ORDER — MONTELUKAST 5 MG/1
AS DIRECTED TABLET, CHEWABLE ORAL
Status: ACTIVE | COMMUNITY

## 2023-11-01 RX ORDER — FAMOTIDINE 20 MG/1
1 TABLET AT BEDTIME AS NEEDED TABLET, FILM COATED ORAL TWICE A DAY
Status: ACTIVE | COMMUNITY

## 2023-11-01 RX ORDER — SODIUM PICOSULFATE, MAGNESIUM OXIDE, AND ANHYDROUS CITRIC ACID 10; 3.5; 12 MG/16.2G; G/16.2G; G/16.2G
AS DIRECTED POWDER, METERED ORAL
Qty: 1 | Refills: 0 | Status: ON HOLD | COMMUNITY
Start: 2016-04-04

## 2023-11-01 RX ORDER — METRONIDAZOLE 500 MG/1
TAKE 1 PO TID AS DIRECTED TABLET, FILM COATED ORAL
Qty: 0 | Refills: 0 | Status: ON HOLD | COMMUNITY
Start: 1900-01-01

## 2023-11-01 RX ORDER — IBUPROFEN 100 MG
TAKE 2 TABLETS (200 MG) BY ORAL ROUTE EVERY 6 HOURS AS NEEDED WITH FOOD TABLET ORAL
Qty: 0 | Refills: 0 | Status: ON HOLD | COMMUNITY
Start: 1900-01-01

## 2023-11-01 RX ORDER — USTEKINUMAB 90 MG/ML
AS DIRECTED INJECTION, SOLUTION SUBCUTANEOUS
Qty: 3 SYRINGES | Refills: 2 | Status: ACTIVE | COMMUNITY
Start: 2023-02-23 | End: 2023-11-20

## 2023-11-01 RX ORDER — ADALIMUMAB 40MG/0.4ML
INJECT 40MG SUBCUTANEOUSLY  EVERY OTHER WEEK KIT SUBCUTANEOUS
Qty: 2 | Refills: 6 | Status: ON HOLD | COMMUNITY
Start: 2020-03-17

## 2023-11-01 RX ORDER — HYDROXYZINE HYDROCHLORIDE 10 MG/1
1 TABLET AS NEEDED TABLET, FILM COATED ORAL ONCE A DAY
Status: ACTIVE | COMMUNITY

## 2023-11-01 RX ORDER — ADALIMUMAB 40MG/0.8ML
INJECT 40MG SUBCUTANEOUSLY EVERY OTHER WEEK AS DIRECTED KIT SUBCUTANEOUS
Qty: 1 | Refills: 3 | Status: ON HOLD | COMMUNITY
Start: 2019-07-18

## 2023-11-01 RX ORDER — CETIRIZINE HYDROCHLORIDE 5 MG/1
1 TABLET TABLET, FILM COATED ORAL ONCE A DAY
Status: ACTIVE | COMMUNITY

## 2023-11-01 NOTE — HPI-OTHER HISTORIES
Labs 12/20/2022:H/H14.9/42.4, MCV 84, , WBC 5.5.  AST 52, ALT 42, ALP 46, T. bili 0.7, CMP otherwise unremarkable.  Normal vitamin D, CRP, ESR.  Negative QuantiFERON-TB gold.  Reactive hepatitis A antibody total.  Nonreactive hepatitis A antibody IgM, hepatitis B surface antigen and antibody, hepatitis B core antibody IgM, hepatitis C antibody. Prior colonoscopy 10/6/2020 for assessment of Crohn's disease revealed mild inflammation characterized by erythema, friability and pseudopolyps mainly in the rectum and sigmoid colon, the remainder the colon was otherwise normal, the distal ileum was also normal.  Multiple random colon biopsies revealed no significant abnormalities, no evidence of dysplasia or inflammation.  He has Crohn's ileitis diagnosed in 2016.   He had a CT scan in April 2016 which noted segmental colonic wall thickening involving the transverse as well as the left colon. He underwent colonoscopy in May 2016 in [place], [place] which was notable for friability, granularity, ulceration, congestion, erythema in the cecum, transverse colon and sigmoid colon compatible with indeterminate colitis. Biopsies revealed chronic active colitis with mild histologic activity, negative for dysplasia. He was initially treated with Asacol, Flagyl and prednisone, with excellent response. Humira was initiated in July 2016. Previous colonoscopy May 2016 by Dr. Brandon Price in [facility] revealed friability, granularity and ulceration in the cecum, transverse colon and sigmoid colon concerning for Crohn's.  Biopsies revealed chronic active colitis, rare granuloma, negative for dysplasia.

## 2023-11-01 NOTE — HPI-TODAY'S VISIT:
Mr. Simpson is a 33 year old male with a history of sleep apnea and Crohn's colitis on Stelara every 8 weeks presenting for follow-up. He was previously on Humira but self-discontinued the medication.  He has been followed by allergy immunology and is currently on Singulair, Zyrtec, Allegra, Hydroxizine and Famotidine.  He was diagnosed with chronic urticaria.  He still has a rash mainly on his hands and his torso.  It does not seem to be affected by sun exposure and sometimes gets better.  It does not correlate with the Stelara injections.  They have not discussed any other biologic treatments.  He was initially diagnosed with skin nodules when he was diagnosed with Crohn's in 1016.  He does not believe it was erythema nodosum.  Overall his Crohn's is doing very well and he is very happy with the Stelara every 8 weeks.  Labs from August are below.    Labs 2/23/23: AST 17, ALT 23, ALP 44, Tbili 0.7.

## 2024-01-11 ENCOUNTER — ERX REFILL RESPONSE (OUTPATIENT)
Dept: URBAN - METROPOLITAN AREA CLINIC 113 | Facility: CLINIC | Age: 34
End: 2024-01-11

## 2024-01-11 RX ORDER — USTEKINUMAB 90 MG/ML
INJECT 1 SYRINGE SUBCUTANEOUSLY  EVERY 8 WEEKS INJECTION, SOLUTION SUBCUTANEOUS
Qty: 1 MILLILITER | Refills: 11 | OUTPATIENT

## 2024-01-11 RX ORDER — USTEKINUMAB 90 MG/ML
INJECT 1 SYRINGE SUBCUTANEOUSLY  EVERY 8 WEEKS INJECTION, SOLUTION SUBCUTANEOUS
Qty: 1 MILLILITER | Refills: 0 | OUTPATIENT

## 2024-02-14 ENCOUNTER — OV EP (OUTPATIENT)
Dept: URBAN - METROPOLITAN AREA CLINIC 113 | Facility: CLINIC | Age: 34
End: 2024-02-14
Payer: COMMERCIAL

## 2024-02-14 VITALS
HEART RATE: 70 BPM | BODY MASS INDEX: 30.07 KG/M2 | DIASTOLIC BLOOD PRESSURE: 62 MMHG | HEIGHT: 69 IN | SYSTOLIC BLOOD PRESSURE: 107 MMHG | TEMPERATURE: 97 F | RESPIRATION RATE: 16 BRPM | WEIGHT: 203 LBS

## 2024-02-14 DIAGNOSIS — K50.10 CROHN'S DISEASE OF COLON WITHOUT COMPLICATION: ICD-10-CM

## 2024-02-14 PROCEDURE — 99214 OFFICE O/P EST MOD 30 MIN: CPT | Performed by: STUDENT IN AN ORGANIZED HEALTH CARE EDUCATION/TRAINING PROGRAM

## 2024-02-14 RX ORDER — SODIUM, POTASSIUM,MAG SULFATES 17.5-3.13G
177ML SOLUTION, RECONSTITUTED, ORAL ORAL
Qty: 354 MILLILITER | Refills: 0 | OUTPATIENT
Start: 2024-02-14 | End: 2024-02-16

## 2024-02-14 RX ORDER — USTEKINUMAB 90 MG/ML
INJECT 1 SYRINGE SUBCUTANEOUSLY  EVERY 8 WEEKS INJECTION, SOLUTION SUBCUTANEOUS
Qty: 1 MILLILITER | Refills: 11 | Status: ACTIVE | COMMUNITY

## 2024-02-14 RX ORDER — METRONIDAZOLE 500 MG/1
TAKE 1 PO TID AS DIRECTED TABLET, FILM COATED ORAL
Qty: 0 | Refills: 0 | Status: ON HOLD | COMMUNITY
Start: 1900-01-01

## 2024-02-14 RX ORDER — IBUPROFEN 100 MG
TAKE 2 TABLETS (200 MG) BY ORAL ROUTE EVERY 6 HOURS AS NEEDED WITH FOOD TABLET ORAL
Qty: 0 | Refills: 0 | Status: ON HOLD | COMMUNITY
Start: 1900-01-01

## 2024-02-14 RX ORDER — SODIUM PICOSULFATE, MAGNESIUM OXIDE, AND ANHYDROUS CITRIC ACID 10; 3.5; 12 MG/16.2G; G/16.2G; G/16.2G
AS DIRECTED POWDER, METERED ORAL
Qty: 1 | Refills: 0 | Status: ON HOLD | COMMUNITY
Start: 2016-04-04

## 2024-02-14 RX ORDER — FAMOTIDINE 20 MG/1
1 TABLET AT BEDTIME AS NEEDED TABLET, FILM COATED ORAL TWICE A DAY
Status: ACTIVE | COMMUNITY

## 2024-02-14 RX ORDER — CETIRIZINE HYDROCHLORIDE 5 MG/1
1 TABLET TABLET, FILM COATED ORAL ONCE A DAY
Status: ACTIVE | COMMUNITY

## 2024-02-14 RX ORDER — FEXOFENADINE HCL 60 MG/1
1 TABLET TABLET, FILM COATED ORAL TWICE A DAY
Status: ACTIVE | COMMUNITY

## 2024-02-14 RX ORDER — ADALIMUMAB 40MG/0.4ML
INJECT 40MG SUBCUTANEOUSLY  EVERY OTHER WEEK KIT SUBCUTANEOUS
Qty: 2 | Refills: 6 | Status: ON HOLD | COMMUNITY
Start: 2020-03-17

## 2024-02-14 RX ORDER — ADALIMUMAB 40MG/0.8ML
INJECT 40 MG SUBCUTANEOUSLY EVERY OTHER WEEK AS DIRECTED KIT SUBCUTANEOUS EVERY OTHER WEEK
Qty: 2 | Refills: 12 | Status: ON HOLD | COMMUNITY

## 2024-02-14 RX ORDER — MONTELUKAST 5 MG/1
AS DIRECTED TABLET, CHEWABLE ORAL
Status: ACTIVE | COMMUNITY

## 2024-02-14 RX ORDER — HYDROXYZINE HYDROCHLORIDE 10 MG/1
1 TABLET AS NEEDED TABLET, FILM COATED ORAL ONCE A DAY
Status: ACTIVE | COMMUNITY

## 2024-02-14 RX ORDER — ADALIMUMAB 40MG/0.8ML
INJECT 40MG SUBCUTANEOUSLY EVERY OTHER WEEK AS DIRECTED KIT SUBCUTANEOUS
Qty: 1 | Refills: 3 | Status: ON HOLD | COMMUNITY
Start: 2019-07-18

## 2024-02-14 NOTE — PHYSICAL EXAM GASTROINTESTINAL
Abdomen , soft, nontender, nondistended , no guarding or rigidity , no masses palpable , normal bowel sounds , Liver and Spleen , no hepatomegaly present , no hepatosplenomegaly , liver nontender , spleen not palpable EXAM note    History  Chris Campo is a 57 year old male who presents basically I saw him several weeks ago he had had a prominent lymph node on the right side by my exam he was unaware of it on. He states one week later he started to wait lean his head back he would get a little pressure over that area she had no fevers or chills no respiratory symptoms and it doesn't hurt all the time just gets in certain positions. He also noted about 2 weeks ago he started having pain over the left upper scapula he says he was tender to touch tender and painful he had to get in different positions but that didn't really help and then that resolved he didn't really feel he had left neck pain but then he would have pain with numbness going down his left arm into his hand is hammered feel on him and tingling and sometimes to different degrees she's not had any strength deficits. He had no injury but had been cutting wood about a half an hour when he would raise and ask about his had and then cut the word. Dhesi only change in his activity on the states when he gets in certain positions he doesn't have a numbness certainly in his arm.      I have reviewed the past medical, family and social history sections including the medications and allergies listed in the above medical record as well as the nursing notes.    Assessment & Plan    Chris was seen today for pain, shoulder pain and advanced directives.    Diagnoses and all orders for this visit:    Radicular pain in left arm at this point no injury but reproducible symptoms on the left side on Medrol Dosepak x-ray I did tell him we may have to do further evaluation we could probably get away with a CT will have to see he will call our office and tell us how he is doing over the next couple of weeks  -     XR Cervical Spine 2-3 Views; Future  -     methylPREDNISolone (MEDROL DOSEPAK) 4 MG tablet; Take 1 tablet by mouth as directed. follow package directions    Neck pain on right side  which I believe is a lymph node swelling we had watched we are watching one in that area will monitor the next week or 2 he's not had any other symptoms if he continues to have symptoms of the CT of the soft tissue      Review of systems    as above  Physical Exam  Vital Signs:    Vitals:    17 1613   BP: 128/82   Pulse: 69   Temp: 97.9 °F (36.6 °C)   TempSrc: Oral   SpO2: 97%   Weight: 91.2 kg   Height: 6' 2.25\" (1.886 m)       Constitutional: WD/WN. In NAD. Appears stated age. Cooperative throughout exam.  HENT:  Normocephalic. Atraumatic. Bilateral external ears normal.  Neck: No masses. No tenderness. Supple. No stridor. No JVD. No bruits. No thyromegaly. however when I do rotate his neck to the right he has no pain when I wrote flex and he flexes very well and extends very well I laterally extend his had obvious pain in the left neck which radiates down to his scapular region and then his arm is feeling the symptoms of numbness.   Respiratory:  Normal breath sounds. No respiratory distress. No wheezes. No crackles. No rhonchi.  Cardiovascular:  Normal heart rate. Normal rhythm. No murmurs. No rubs. No gallops.    Strength in his hands 5 out of 5 grasp is 5 out of 5 on there's no pain with supination I did rotation of his shoulder is good there is no deficits there is no laxity there is no tremor. He does have some point tenderness on the super's spell not as area and the  RESULTS  Admission on 2017, Discharged on 2017   Component Date Value Ref Range Status   • Pathology Report 2017    Final                    Value:Name: DARLENE MARCOS                MRN:     [id number]    /Age:1959 (Age: 57)             Visit#:  [id number]    Sex: M                        Pathology Report        Client: [facility]        Submitting Physician: Juan Bradley MD        Additional Physician(s): Bridgett Solis MD        Date Specimen Collected: 17            Accession #:  [id number]    Date Specimen Received:  02/03/17           Requisition    #:[id number]    Date Reported:           2/6/2017 09:56   Location:     [facility]        ______________________________________________________________________________    Pathologic Diagnosis :    Colon, hepatic flexure polyps, biopsies:    - No diagnostic abnormality.        Chip Singh MD    ** Electronic Signature (MPW) 2/6/2017 09:56 **    ______________________________________________________________________________        Clinical Information:    HX OF COLON POLYPS        Specimen(s) Submitted:     HEPATIC FLEXURE                           POLYPS        Gross Description:    The specimen is received in formalin labeled with the patient's name and    \"DC-hepatic flexure polyps\" and consists of two fragments of tan-yellow soft    tissue measuring 0.3 and 0.5 cm in greatest dimension.  The specimen is    entirely submitted in a single cassette.        [facility] 2/3/2017 03:18 PM            Microscopic Description:    Sections show small fragments of clotted mucosa without definite adenomatous    or hyperplastic glands identified. There is no malignancy.        MPW/mpw 02/06/17        Fee Codes:     A: [id number], [id number]        Performing Lab Location (Unless otherwise specified):    [place] [address]

## 2024-02-14 NOTE — HPI-OTHER HISTORIES
Labs 12/20/2022:H/H14.9/42.4, MCV 84, , WBC 5.5.  AST 52, ALT 42, ALP 46, T. bili 0.7, CMP otherwise unremarkable.  Normal vitamin D, CRP, ESR.  Negative QuantiFERON-TB gold.  Reactive hepatitis A antibody total.  Nonreactive hepatitis A antibody IgM, hepatitis B surface antigen and antibody, hepatitis B core antibody IgM, hepatitis C antibody. Prior colonoscopy 10/6/2020 for assessment of Crohn's disease revealed mild inflammation characterized by erythema, friability and pseudopolyps mainly in the rectum and sigmoid colon, the remainder the colon was otherwise normal, the distal ileum was also normal.  Multiple random colon biopsies revealed no significant abnormalities, no evidence of dysplasia or inflammation.  He has Crohn's ileitis diagnosed in 2016.   He had a CT scan in April 2016 which noted segmental colonic wall thickening involving the transverse as well as the left colon. He underwent colonoscopy in May 2016 in [place], [place] which was notable for friability, granularity, ulceration, congestion, erythema in the cecum, transverse colon and sigmoid colon compatible with indeterminate colitis. Biopsies revealed chronic active colitis with mild histologic activity, negative for dysplasia. He was initially treated with Asacol, Flagyl and prednisone, with excellent response. Humira was initiated in July 2016. Previous colonoscopy May 2016 by Dr. Brandon Price in [facility] revealed friability, granularity and ulceration in the cecum, transverse colon and sigmoid colon concerning for Crohn's.  Biopsies revealed chronic active colitis, rare granuloma, negative for dysplasia.

## 2024-05-28 ENCOUNTER — TELEPHONE ENCOUNTER (OUTPATIENT)
Dept: URBAN - METROPOLITAN AREA CLINIC 113 | Facility: CLINIC | Age: 34
End: 2024-05-28

## 2024-06-04 ENCOUNTER — TELEPHONE ENCOUNTER (OUTPATIENT)
Dept: URBAN - METROPOLITAN AREA CLINIC 113 | Facility: CLINIC | Age: 34
End: 2024-06-04

## 2024-06-10 ENCOUNTER — TELEPHONE ENCOUNTER (OUTPATIENT)
Dept: URBAN - METROPOLITAN AREA CLINIC 113 | Facility: CLINIC | Age: 34
End: 2024-06-10

## 2024-06-10 RX ORDER — USTEKINUMAB 90 MG/ML
INJECT 1 SYRINGE SUBCUTANEOUSLY  EVERY 8 WEEKS INJECTION, SOLUTION SUBCUTANEOUS
Qty: 1 MILLILITER | Refills: 11

## 2024-06-25 ENCOUNTER — TELEPHONE ENCOUNTER (OUTPATIENT)
Dept: URBAN - METROPOLITAN AREA CLINIC 113 | Facility: CLINIC | Age: 34
End: 2024-06-25

## 2024-08-28 ENCOUNTER — TELEPHONE ENCOUNTER (OUTPATIENT)
Dept: URBAN - METROPOLITAN AREA CLINIC 113 | Facility: CLINIC | Age: 34
End: 2024-08-28

## 2025-01-27 ENCOUNTER — TELEPHONE ENCOUNTER (OUTPATIENT)
Dept: URBAN - METROPOLITAN AREA CLINIC 113 | Facility: CLINIC | Age: 35
End: 2025-01-27

## 2025-01-27 RX ORDER — USTEKINUMAB 90 MG/ML
INJECT 1 SYRINGE SUBCUTANEOUSLY EVERY 8 WEEKS INJECTION, SOLUTION SUBCUTANEOUS
Qty: 1 | Refills: 11

## 2025-01-30 ENCOUNTER — TELEPHONE ENCOUNTER (OUTPATIENT)
Dept: URBAN - METROPOLITAN AREA CLINIC 113 | Facility: CLINIC | Age: 35
End: 2025-01-30

## 2025-02-18 ENCOUNTER — TELEPHONE ENCOUNTER (OUTPATIENT)
Dept: URBAN - METROPOLITAN AREA CLINIC 113 | Facility: CLINIC | Age: 35
End: 2025-02-18

## 2025-03-11 NOTE — HPI-TODAY'S VISIT:
SUBJECTIVE     Chief Complaint   Patient presents with    Annual Exam       HPI  Braydon Dodge is a very pleasant 66 y.o. male with hyperlipidemia that presents for annual exam. Pt has been doing well since our last appointment.    Pt is not UTD on age appropriate CA screening.  Due for prostate cancer screening.    Family, social, surgical Hx reviewed     HLD -   Currently diet controlled  : 02/02/2024  Lab Results   Component Value Date    LDLCALC 164.8 (H) 02/02/2024     Due for lipid recheck.    GERD: Duexis prn      Health Maintenance         Date Due Completion Date    Pneumococcal Vaccines (Age 50+) (1 of 1 - PCV) Never done ---    Influenza Vaccine (1) 09/01/2024 11/12/2020    COVID-19 Vaccine (2 - 2024-25 season) 09/01/2024 8/4/2021    PROSTATE-SPECIFIC ANTIGEN 02/02/2025 2/2/2024    HIV Screening 11/12/2026 (Originally 2/28/1974) ---    Hemoglobin A1c (Diabetic Prevention Screening) 02/02/2027 2/2/2024    Lipid Panel 02/02/2029 2/2/2024    TETANUS VACCINE 03/20/2029 3/20/2019    Colorectal Cancer Screening 10/31/2030 10/31/2023    RSV Vaccine (Age 60+ and Pregnant patients) (1 - 1-dose 75+ series) 02/28/2036 ---          Separate EM concern:    Fatigue-Endorses increased fatigue over the past several months. No changes in activities, diet, or sleep. No medication changes. No recent illnesses.  + Apneic epic episodes and increase in snoring reported by his spouse    PAST MEDICAL HISTORY:  Past Medical History:   Diagnosis Date    HLD (hyperlipidemia)        PAST SURGICAL HISTORY:  Past Surgical History:   Procedure Laterality Date    COLONOSCOPY N/A 04/21/2018    Procedure: COLONOSCOPY;  Surgeon: Bryant Jasmine MD;  Location: [facility] ENDO ([address] [place] [address]);  Service: Endoscopy;  Laterality: N/A;    COLONOSCOPY N/A 10/31/2023    Procedure: COLONOSCOPY;  Surgeon: Camilo Perdue MD;  Location: [facility] ENDOSCOPY;  Service: Endoscopy;  Laterality: N/A;  inst. emailed to patient.Tb  referral:Dr. Reich  10/24- pre call  Last visit 11/1/2023; PCP Dr. Chau Prasad Seen by Dr. Oziel Szymanski Mr. Simpson is a 33 year old male with a history of sleep apnea and Crohn's colitis on Stelara every 8 weeks presenting for follow-up. He was previously on Humira but self-discontinued the medication.  He has been followed by allergy immunology and is currently on Singulair, Zyrtec, Allegra, Hydroxizine and Famotidine.  He was diagnosed with chronic urticaria.  He still has a rash mainly on his hands and his torso.  It does not seem to be affected by sun exposure and sometimes gets better.  It does not correlate with the Stelara injections.  They have not discussed any other biologic treatments.  He was initially diagnosed with skin nodules when he was diagnosed with Crohn's in 1016.  He does not believe it was erythema nodosum.  Overall his Crohn's is doing very well and he is very happy with the Stelara every 8 weeks.  Labs from August are below.  Labs 2/23/23: AST 17, ALT 23, ALP 44, Tbili 0.7. . Follow-up 2/14/2024 Patient has been doing well on Stelara.  He denies abdominal pain, diarrhea.  He has minimal rectal bleeding which she feels is secondary to hemorrhoids. complete.  DBM    HERNIA REPAIR      abdominal    VASECTOMY  Dont recall       SOCIAL HISTORY:  Social History     Socioeconomic History    Marital status:    Tobacco Use    Smoking status: Former     Current packs/day: 0.00     Average packs/day: 1 pack/day for 4.0 years (4.0 ttl pk-yrs)     Types: Cigarettes     Quit date: 2000     Years since quittin.1     Passive exposure: Never    Smokeless tobacco: Never   Substance and Sexual Activity    Alcohol use: Yes     Alcohol/week: 3.0 standard drinks of alcohol     Types: 3 Glasses of wine per week     Comment: bottle a wine per week     Drug use: No    Sexual activity: Yes     Partners: Female     Birth control/protection: None   Social History Narrative    Works as tug boat captain     Social Drivers of Health     Financial Resource Strain: Patient Declined (2023)    Overall Financial Resource Strain (CARDIA)     Difficulty of Paying Living Expenses: Patient declined   Food Insecurity: Patient Declined (2023)    Hunger Vital Sign     Worried About Running Out of Food in the Last Year: Patient declined     Ran Out of Food in the Last Year: Patient declined   Transportation Needs: Patient Declined (2023)    PRAPARE - Transportation     Lack of Transportation (Medical): Patient declined     Lack of Transportation (Non-Medical): Patient declined   Physical Activity: Sufficiently Active (2023)    Exercise Vital Sign     Days of Exercise per Week: 5 days     Minutes of Exercise per Session: 150+ min   Stress: Patient Declined (2023)    [employer] - Occupational Stress Questionnaire     Feeling of Stress : Patient declined   Housing Stability: Unknown (2023)    Housing Stability Vital Sign     Unable to Pay for Housing in the Last Year: No     Unstable Housing in the Last Year: Patient refused       FAMILY HISTORY:  Family History   Problem Relation Name Age of Onset    Myelodysplastic syndrome  Mother 76         Multiple myeloma    Hyperlipidemia Father 81     Multiple myeloma Father 81     Hearing loss Father 81     Asbestos Father 81     Cancer Father 81     Colon cancer Neg Hx      Prostate cancer Neg Hx         ALLERGIES AND MEDICATIONS: updated and reviewed.  Review of patient's allergies indicates:  No Known Allergies  Current Outpatient Medications   Medication Sig Dispense Refill    ibuprofen-famotidine (DUEXIS) 800-26.6 mg Tab Take 1 tablet by mouth 3 (three) times daily. 30 tablet 11    triamcinolone acetonide 0.1% (KENALOG) 0.1 % cream Apply topically 2 (two) times daily. 30 g 0     No current facility-administered medications for this visit.       ROS  Review of Systems   Constitutional:  Positive for malaise/fatigue. Negative for fever and weight loss.   Respiratory:  Negative for cough and shortness of breath.    Cardiovascular:  Negative for chest pain and palpitations.   Gastrointestinal:  Negative for abdominal pain, constipation, diarrhea, nausea and vomiting.   Genitourinary:  Negative for dysuria.   Musculoskeletal:  Negative for back pain and joint pain.   Skin:  Negative for rash.   Neurological:  Negative for dizziness, weakness and headaches.   Psychiatric/Behavioral:  Negative for depression. The patient is not nervous/anxious.          OBJECTIVE     Physical Exam  Vitals:    03/11/25 1403   BP: 124/70   Pulse: 82    Body mass index is 30 kg/m².  Weight: 97.5 kg (214 lb 15.2 oz)         Physical Exam  HENT:      Head: Normocephalic and atraumatic.      Nose: Nose normal.      Mouth/Throat:      Mouth: Mucous membranes are moist.      Pharynx: Oropharynx is clear.   Eyes:      Extraocular Movements: Extraocular movements intact.      Conjunctiva/sclera: Conjunctivae normal.      Pupils: Pupils are equal, round, and reactive to light.   Cardiovascular:      Rate and Rhythm: Normal rate and regular rhythm.   Pulmonary:      Effort: Pulmonary effort is normal.      Breath sounds:  Normal breath sounds.   Musculoskeletal:         General: No swelling. Normal range of motion.      Cervical back: Normal range of motion.      Right lower leg: No edema.      Left lower leg: No edema.   Skin:     General: Skin is warm.      Findings: No lesion or rash.   Neurological:      General: No focal deficit present.      Mental Status: He is alert and oriented to person, place, and time.      Motor: No weakness.               ASSESSMENT     66 y.o. male with     1. Annual physical exam    2. Mixed hyperlipidemia    3. Prostate cancer screening    4. Screening for diabetes mellitus    5. Gastroesophageal reflux disease, unspecified whether esophagitis present    6. Fatigue, unspecified type    7. Apneic episode        PLAN:     1. Annual physical exam  -     TSH; Future; Expected date: 03/11/2025  -     Lipid Panel; Future; Expected date: 03/11/2025  -     Comprehensive Metabolic Panel; Future; Expected date: 03/11/2025  -     CBC Auto Differential; Future; Expected date: 03/11/2025    2. Mixed hyperlipidemia  Follow up lipid panel    3. Prostate cancer screening  -     PSA, Screening; Future; Expected date: 03/11/2025    4. Screening for diabetes mellitus  -     Hemoglobin A1C; Future; Expected date: 03/11/2025    5. Gastroesophageal reflux disease, unspecified whether esophagitis present  -     ibuprofen-famotidine (DUEXIS) 800-26.6 mg Tab; Take 1 tablet by mouth 3 (three) times daily.  Dispense: 30 tablet; Refill: 11  Stable on medications, continue regimen    6. Fatigue, unspecified type  -     Urinalysis; Future; Expected date: 03/11/2025  -     Ambulatory referral/consult to Sleep Disorders; Future; Expected date: 03/18/2025  -     Ferritin; Future; Expected date: 03/13/2025  -     Iron and TIBC; Future; Expected date: 03/13/2025  -     Vitamin B6; Future; Expected date: 03/13/2025  -     Folate; Future; Expected date: 03/13/2025  -     Vitamin B12; Future; Expected date: 03/13/2025  -     Vitamin D;  Future; Expected date: 03/13/2025  -     TESTOSTERONE; Future; Expected date: 03/13/2025  -     Testosterone, free; Future; Expected date: 03/13/2025  Follow up lab work. Counseled obtaining 7-8 hours of nightly sleep, adequate hydration with water, well balanced diet. And 150+ minutes of exercise/week.    7. Apneic episode  -     Ambulatory referral/consult to Sleep Disorders; Future; Expected date: 03/18/2025  Referral placed to sleep medicine        Discussed age and gender appropriate screenings at this visit and encouraged a healthy diet low in simple carbohydrates, and increased physical activity.  Counseled on medically appropriate vaccines based on age and current health condition.  Screening test reviewed and discussed with patient.      RTC in 1 year     Elise Cuello MD

## 2025-06-03 ENCOUNTER — WEB ENCOUNTER (OUTPATIENT)
Dept: URBAN - METROPOLITAN AREA CLINIC 113 | Facility: CLINIC | Age: 35
End: 2025-06-03

## 2025-06-09 ENCOUNTER — WEB ENCOUNTER (OUTPATIENT)
Dept: URBAN - METROPOLITAN AREA CLINIC 113 | Facility: CLINIC | Age: 35
End: 2025-06-09

## 2025-08-07 ENCOUNTER — TELEPHONE ENCOUNTER (OUTPATIENT)
Dept: URBAN - METROPOLITAN AREA CLINIC 113 | Facility: CLINIC | Age: 35
End: 2025-08-07

## 2025-08-07 RX ORDER — USTEKINUMAB 90 MG/ML
INJECT 1 SYRINGE SUBCUTANEOUSLY EVERY 8 WEEKS INJECTION, SOLUTION SUBCUTANEOUS
Qty: 1 | Refills: 11
End: 2027-06-11